# Patient Record
Sex: FEMALE | Race: WHITE | Employment: OTHER | URBAN - METROPOLITAN AREA
[De-identification: names, ages, dates, MRNs, and addresses within clinical notes are randomized per-mention and may not be internally consistent; named-entity substitution may affect disease eponyms.]

---

## 2017-03-03 ENCOUNTER — HOSPITAL ENCOUNTER (OUTPATIENT)
Dept: SURGERY | Age: 62
Discharge: HOME | End: 2017-03-03
Attending: ANESTHESIOLOGY | Admitting: ANESTHESIOLOGY

## 2017-03-03 VITALS
BODY MASS INDEX: 18.69 KG/M2 | WEIGHT: 119.1 LBS | TEMPERATURE: 97.1 F | RESPIRATION RATE: 16 BRPM | HEIGHT: 67 IN | OXYGEN SATURATION: 95 % | HEART RATE: 88 BPM | SYSTOLIC BLOOD PRESSURE: 154 MMHG | DIASTOLIC BLOOD PRESSURE: 82 MMHG

## 2017-03-03 LAB
ANION GAP SERPL CALCULATED.3IONS-SCNC: 7 MMOL/L (ref 3–16)
BUN BLDV-MCNC: 19 MG/DL (ref 7–20)
CALCIUM SERPL-MCNC: 8.9 MG/DL (ref 8.3–10.6)
CHLORIDE BLD-SCNC: 98 MMOL/L (ref 99–110)
CO2: 32 MMOL/L (ref 21–32)
CREAT SERPL-MCNC: 0.9 MG/DL (ref 0.6–1.2)
GFR AFRICAN AMERICAN: >60
GFR NON-AFRICAN AMERICAN: >60
GLUCOSE BLD-MCNC: 167 MG/DL (ref 70–99)
GLUCOSE BLD-MCNC: 185 MG/DL (ref 70–99)
GLUCOSE BLD-MCNC: 213 MG/DL (ref 70–99)
HCT VFR BLD CALC: 34.3 % (ref 36–48)
HEMOGLOBIN: 10.9 G/DL (ref 12–16)
MCH RBC QN AUTO: 27.9 PG (ref 26–34)
MCHC RBC AUTO-ENTMCNC: 31.6 G/DL (ref 31–36)
MCV RBC AUTO: 88.4 FL (ref 80–100)
PDW BLD-RTO: 16.2 % (ref 12.4–15.4)
PERFORMED ON: ABNORMAL
PERFORMED ON: ABNORMAL
PLATELET # BLD: 325 K/UL (ref 135–450)
PMV BLD AUTO: 7.4 FL (ref 5–10.5)
POTASSIUM SERPL-SCNC: 4.3 MMOL/L (ref 3.5–5.1)
RBC # BLD: 3.89 M/UL (ref 4–5.2)
SODIUM BLD-SCNC: 137 MMOL/L (ref 136–145)
WBC # BLD: 9 K/UL (ref 4–11)

## 2017-03-03 RX ORDER — SODIUM CHLORIDE 9 MG/ML
INJECTION, SOLUTION INTRAVENOUS CONTINUOUS
Status: DISCONTINUED | OUTPATIENT
Start: 2017-03-03 | End: 2017-03-04 | Stop reason: HOSPADM

## 2017-03-03 RX ORDER — CIPROFLOXACIN 2 MG/ML
400 INJECTION, SOLUTION INTRAVENOUS ONCE
Status: COMPLETED | OUTPATIENT
Start: 2017-03-03 | End: 2017-03-03

## 2017-03-03 RX ORDER — CLINDAMYCIN PHOSPHATE 900 MG/50ML
900 INJECTION INTRAVENOUS
Status: DISCONTINUED | OUTPATIENT
Start: 2017-03-03 | End: 2017-03-03 | Stop reason: CLARIF

## 2017-03-03 RX ORDER — LIDOCAINE HYDROCHLORIDE 10 MG/ML
0.5 INJECTION, SOLUTION EPIDURAL; INFILTRATION; INTRACAUDAL; PERINEURAL ONCE
Status: DISCONTINUED | OUTPATIENT
Start: 2017-03-03 | End: 2017-03-04 | Stop reason: HOSPADM

## 2017-03-03 RX ADMIN — SODIUM CHLORIDE: 9 INJECTION, SOLUTION INTRAVENOUS at 12:57

## 2017-03-03 RX ADMIN — CIPROFLOXACIN 400 MG: 2 INJECTION, SOLUTION INTRAVENOUS at 13:26

## 2018-02-28 ENCOUNTER — TELEPHONE (OUTPATIENT)
Dept: ENDOCRINOLOGY | Age: 63
End: 2018-02-28

## 2018-02-28 DIAGNOSIS — E10.649 TYPE 1 DIABETES MELLITUS WITH HYPOGLYCEMIA AND WITHOUT COMA (HCC): Primary | ICD-10-CM

## 2018-02-28 DIAGNOSIS — E55.9 VITAMIN D DEFICIENCY: ICD-10-CM

## 2018-03-01 NOTE — TELEPHONE ENCOUNTER
I Mailed her lab order to her home address. I call pt , unable to reached, left voice message to call back.

## 2018-10-29 ENCOUNTER — OFFICE VISIT (OUTPATIENT)
Dept: ENDOCRINOLOGY | Age: 63
End: 2018-10-29
Payer: MEDICARE

## 2018-10-29 ENCOUNTER — TELEPHONE (OUTPATIENT)
Dept: ENDOCRINOLOGY | Age: 63
End: 2018-10-29

## 2018-10-29 VITALS
HEIGHT: 67 IN | BODY MASS INDEX: 18.68 KG/M2 | DIASTOLIC BLOOD PRESSURE: 82 MMHG | WEIGHT: 119 LBS | SYSTOLIC BLOOD PRESSURE: 138 MMHG | OXYGEN SATURATION: 95 % | HEART RATE: 95 BPM

## 2018-10-29 DIAGNOSIS — E78.2 MIXED HYPERLIPIDEMIA: ICD-10-CM

## 2018-10-29 DIAGNOSIS — I10 ESSENTIAL HYPERTENSION: ICD-10-CM

## 2018-10-29 DIAGNOSIS — E04.1 THYROID NODULE: ICD-10-CM

## 2018-10-29 DIAGNOSIS — D35.02 ADENOMA OF LEFT ADRENAL GLAND: ICD-10-CM

## 2018-10-29 PROCEDURE — 3017F COLORECTAL CA SCREEN DOC REV: CPT | Performed by: INTERNAL MEDICINE

## 2018-10-29 PROCEDURE — G8484 FLU IMMUNIZE NO ADMIN: HCPCS | Performed by: INTERNAL MEDICINE

## 2018-10-29 PROCEDURE — 2022F DILAT RTA XM EVC RTNOPTHY: CPT | Performed by: INTERNAL MEDICINE

## 2018-10-29 PROCEDURE — G8420 CALC BMI NORM PARAMETERS: HCPCS | Performed by: INTERNAL MEDICINE

## 2018-10-29 PROCEDURE — 3046F HEMOGLOBIN A1C LEVEL >9.0%: CPT | Performed by: INTERNAL MEDICINE

## 2018-10-29 PROCEDURE — G8427 DOCREV CUR MEDS BY ELIG CLIN: HCPCS | Performed by: INTERNAL MEDICINE

## 2018-10-29 PROCEDURE — 1036F TOBACCO NON-USER: CPT | Performed by: INTERNAL MEDICINE

## 2018-10-29 PROCEDURE — 99215 OFFICE O/P EST HI 40 MIN: CPT | Performed by: INTERNAL MEDICINE

## 2018-10-29 RX ORDER — LEVOTHYROXINE SODIUM 0.05 MG/1
50 TABLET ORAL DAILY
Qty: 30 TABLET | Refills: 3 | Status: SHIPPED | OUTPATIENT
Start: 2018-10-29 | End: 2019-02-17 | Stop reason: SDUPTHER

## 2018-10-29 RX ORDER — CIPROFLOXACIN 250 MG/1
250 TABLET, FILM COATED ORAL 2 TIMES DAILY
COMMUNITY
End: 2019-07-01

## 2018-10-29 NOTE — PROGRESS NOTES
Agus Zambrano is a 58 y.o. female is a known case of type 1 DM (s/p total pancreatectomy 8/07) with chronic abdominal pain, for which she underwent lysis adhesion in Dec 2008 with significant improvement   She started having episodes of pancreatitis and was diagnosed with pseudocyst in pancreas in the early 1980's she had partial whipple in 1989 but she continued to have issues with pancreas and eventually had total pancreactomy     She has hyperlipidemia and is on meds now   Also for panc enzyme deficiency she has been taking creon   She has hypothyroidism and incidental adrenal adenoma which has stayed stable but in the past she was unable to get hormonal finn due to repetitive use of steroid for pain   She was noted to have blocking kidney stone and her urologist tried to stent but couldn't so they ended up having Laser On 12/2015>> --Cystoscopy,Ureteroscopy on the left,Laser Lithotripsy,Stone manipulation with basket retrieval of stone,Removal of left nephroureteral catheter. ---she has been getting steroid shots in hip and knee last one was in April 2016     INTERIM:    Diabetes   She presents for her follow-up diabetic visit. She has type 1 diabetes mellitus. MedicAlert identification noted. The initial diagnosis of diabetes was made 10 years ago. Her disease course has been improving. Hypoglycemia symptoms include dizziness and headaches. Associated symptoms include polyuria and weight loss. There are no hypoglycemic complications. Symptoms are stable. Diabetic complications include peripheral neuropathy. Risk factors for coronary artery disease include diabetes mellitus, hypertension, dyslipidemia and post-menopausal. Current diabetic treatment includes insulin pump. She is compliant with treatment most of the time. Her weight is stable. She is following a generally healthy diet. Meal planning includes avoidance of concentrated sweets. She has not had a previous visit with a dietitian.  She rarely participates

## 2018-10-30 ENCOUNTER — TELEPHONE (OUTPATIENT)
Dept: ENDOCRINOLOGY | Age: 63
End: 2018-10-30

## 2018-12-12 NOTE — TELEPHONE ENCOUNTER
Pt diabetes management needs addendum made to your note, it needs to state the pt tests 9 times per day for medicare.  Note will need faxed to [phone]

## 2018-12-27 RX ORDER — IBUPROFEN 600 MG/1
1 TABLET ORAL SEE ADMIN INSTRUCTIONS
Qty: 1 KIT | Refills: 3 | Status: SHIPPED | OUTPATIENT
Start: 2018-12-27

## 2019-02-04 ENCOUNTER — TELEPHONE (OUTPATIENT)
Dept: ENDOCRINOLOGY | Age: 64
End: 2019-02-04

## 2019-02-18 RX ORDER — LEVOTHYROXINE SODIUM 0.05 MG/1
TABLET ORAL
Qty: 30 TABLET | Refills: 3 | Status: SHIPPED | OUTPATIENT
Start: 2019-02-18 | End: 2019-06-13 | Stop reason: SDUPTHER

## 2019-03-01 LAB — DIABETIC RETINOPATHY: NEGATIVE

## 2019-03-04 ENCOUNTER — TELEPHONE (OUTPATIENT)
Dept: ENDOCRINOLOGY | Age: 64
End: 2019-03-04

## 2019-03-29 ENCOUNTER — HOSPITAL ENCOUNTER (OUTPATIENT)
Age: 64
Discharge: HOME | End: 2019-03-29
Payer: MEDICARE

## 2019-03-29 DIAGNOSIS — D35.02 ADENOMA OF LEFT ADRENAL GLAND: ICD-10-CM

## 2019-03-29 DIAGNOSIS — E04.1 THYROID NODULE: ICD-10-CM

## 2019-03-29 LAB
A/G RATIO: 1.1 (ref 1.1–2.2)
ALBUMIN SERPL-MCNC: 3.6 G/DL (ref 3.4–5)
ALP BLD-CCNC: 76 U/L (ref 40–129)
ALT SERPL-CCNC: 9 U/L (ref 10–40)
ANION GAP SERPL CALCULATED.3IONS-SCNC: 13 MMOL/L (ref 3–16)
AST SERPL-CCNC: 17 U/L (ref 15–37)
BILIRUB SERPL-MCNC: <0.2 MG/DL (ref 0–1)
BUN BLDV-MCNC: 23 MG/DL (ref 7–20)
CALCIUM SERPL-MCNC: 8.9 MG/DL (ref 8.3–10.6)
CHLORIDE BLD-SCNC: 104 MMOL/L (ref 99–110)
CHOLESTEROL, TOTAL: 307 MG/DL (ref 0–199)
CO2: 23 MMOL/L (ref 21–32)
CREAT SERPL-MCNC: 1 MG/DL (ref 0.6–1.2)
CREATININE URINE: 52.1 MG/DL (ref 28–259)
GFR AFRICAN AMERICAN: >60
GFR NON-AFRICAN AMERICAN: 56
GLOBULIN: 3.2 G/DL
GLUCOSE BLD-MCNC: 115 MG/DL (ref 70–99)
HDLC SERPL-MCNC: 101 MG/DL (ref 40–60)
LDL CHOLESTEROL CALCULATED: 187 MG/DL
MICROALBUMIN UR-MCNC: 1.7 MG/DL
MICROALBUMIN/CREAT UR-RTO: 32.6 MG/G (ref 0–30)
POTASSIUM SERPL-SCNC: 3.6 MMOL/L (ref 3.5–5.1)
SODIUM BLD-SCNC: 140 MMOL/L (ref 136–145)
TOTAL PROTEIN: 6.8 G/DL (ref 6.4–8.2)
TRIGL SERPL-MCNC: 95 MG/DL (ref 0–150)
TSH SERPL DL<=0.05 MIU/L-ACNC: 4.14 UIU/ML (ref 0.27–4.2)
VITAMIN B-12: 964 PG/ML (ref 211–911)
VLDLC SERPL CALC-MCNC: 19 MG/DL

## 2019-03-29 PROCEDURE — 83036 HEMOGLOBIN GLYCOSYLATED A1C: CPT

## 2019-03-29 PROCEDURE — 82088 ASSAY OF ALDOSTERONE: CPT

## 2019-03-29 PROCEDURE — 80061 LIPID PANEL: CPT

## 2019-03-29 PROCEDURE — 84244 ASSAY OF RENIN: CPT

## 2019-03-29 PROCEDURE — 82043 UR ALBUMIN QUANTITATIVE: CPT

## 2019-03-29 PROCEDURE — 82607 VITAMIN B-12: CPT

## 2019-03-29 PROCEDURE — 80053 COMPREHEN METABOLIC PANEL: CPT

## 2019-03-29 PROCEDURE — 36415 COLL VENOUS BLD VENIPUNCTURE: CPT

## 2019-03-29 PROCEDURE — 82570 ASSAY OF URINE CREATININE: CPT

## 2019-03-29 PROCEDURE — 84681 ASSAY OF C-PEPTIDE: CPT

## 2019-03-29 PROCEDURE — 84443 ASSAY THYROID STIM HORMONE: CPT

## 2019-03-30 LAB
ESTIMATED AVERAGE GLUCOSE: 174.3 MG/DL
HBA1C MFR BLD: 7.7 %

## 2019-04-01 ENCOUNTER — OFFICE VISIT (OUTPATIENT)
Dept: ENDOCRINOLOGY | Age: 64
End: 2019-04-01
Payer: MEDICARE

## 2019-04-01 VITALS
WEIGHT: 122.2 LBS | SYSTOLIC BLOOD PRESSURE: 138 MMHG | HEIGHT: 67 IN | DIASTOLIC BLOOD PRESSURE: 84 MMHG | BODY MASS INDEX: 19.18 KG/M2 | OXYGEN SATURATION: 96 % | HEART RATE: 89 BPM

## 2019-04-01 DIAGNOSIS — I10 ESSENTIAL HYPERTENSION: ICD-10-CM

## 2019-04-01 DIAGNOSIS — E78.2 MIXED HYPERLIPIDEMIA: ICD-10-CM

## 2019-04-01 DIAGNOSIS — E06.3 HYPOTHYROIDISM DUE TO HASHIMOTO'S THYROIDITIS: ICD-10-CM

## 2019-04-01 DIAGNOSIS — E03.8 HYPOTHYROIDISM DUE TO HASHIMOTO'S THYROIDITIS: ICD-10-CM

## 2019-04-01 DIAGNOSIS — K86.1 CHRONIC BILIARY PANCREATITIS (HCC): ICD-10-CM

## 2019-04-01 LAB — ALDOSTERONE: 7.1 NG/DL

## 2019-04-01 PROCEDURE — 3017F COLORECTAL CA SCREEN DOC REV: CPT | Performed by: INTERNAL MEDICINE

## 2019-04-01 PROCEDURE — G8427 DOCREV CUR MEDS BY ELIG CLIN: HCPCS | Performed by: INTERNAL MEDICINE

## 2019-04-01 PROCEDURE — 3045F PR MOST RECENT HEMOGLOBIN A1C LEVEL 7.0-9.0%: CPT | Performed by: INTERNAL MEDICINE

## 2019-04-01 PROCEDURE — 1036F TOBACCO NON-USER: CPT | Performed by: INTERNAL MEDICINE

## 2019-04-01 PROCEDURE — G8400 PT W/DXA NO RESULTS DOC: HCPCS | Performed by: INTERNAL MEDICINE

## 2019-04-01 PROCEDURE — 1090F PRES/ABSN URINE INCON ASSESS: CPT | Performed by: INTERNAL MEDICINE

## 2019-04-01 PROCEDURE — 1123F ACP DISCUSS/DSCN MKR DOCD: CPT | Performed by: INTERNAL MEDICINE

## 2019-04-01 PROCEDURE — 4040F PNEUMOC VAC/ADMIN/RCVD: CPT | Performed by: INTERNAL MEDICINE

## 2019-04-01 PROCEDURE — 2022F DILAT RTA XM EVC RTNOPTHY: CPT | Performed by: INTERNAL MEDICINE

## 2019-04-01 PROCEDURE — G8420 CALC BMI NORM PARAMETERS: HCPCS | Performed by: INTERNAL MEDICINE

## 2019-04-01 PROCEDURE — 99215 OFFICE O/P EST HI 40 MIN: CPT | Performed by: INTERNAL MEDICINE

## 2019-04-01 NOTE — PROGRESS NOTES
Dejah Marie is a 71 y.o. female seen for type 1 DM (s/p total pancreatectomy 8/07) with chronic abdominal pain, for which she underwent lysis adhesion in Dec 2008 with significant improvement   She started having episodes of pancreatitis and was diagnosed with pseudocyst in pancreas in the early 1980's she had partial whipple in 1989 but she continued to have issues with pancreas and eventually had total pancreactomy     She has hyperlipidemia and is on meds now   Also for panc enzyme deficiency she has been taking creon   She has hypothyroidism and incidental adrenal adenoma which has stayed stable but in the past she was unable to get hormonal finn due to repetitive use of steroid for pain   She was noted to have blocking kidney stone and her urologist tried to stent but couldn't so they ended up having Laser On 12/2015>> --Cystoscopy,Ureteroscopy on the left,Laser Lithotripsy,Stone manipulation with basket retrieval of stone,Removal of left nephroureteral catheter. ---she got steroid shots in hip and knee last one was in April 2016     INTERIM:    Diabetes   She presents for her follow-up diabetic visit. She has type 1 diabetes mellitus. MedicAlert identification noted. The initial diagnosis of diabetes was made 10 years ago. Her disease course has been improving. Hypoglycemia symptoms include dizziness and headaches. Associated symptoms include polyuria and weight loss. There are no hypoglycemic complications. Symptoms are stable. Diabetic complications include peripheral neuropathy. Risk factors for coronary artery disease include diabetes mellitus, hypertension, dyslipidemia and post-menopausal. Current diabetic treatment includes insulin pump. She is compliant with treatment most of the time. Her weight is stable. She is following a generally healthy diet. Meal planning includes avoidance of concentrated sweets. She has not had a previous visit with a dietitian. She rarely participates in exercise.  Her home recent weight loss, no changes in appetite  Eyes: no eye pain, no change in vision, no eye redness, no eye irritation, no double vision  Ears, nose, throat: no nasal congestion, no sore throat, no earache, no decrease in hearing, no hoarseness, no dry mouth, no sinus problems, no difficulty swallowing, no neck lumps, no dental problems, no mouth sores, no ringing in ears  Pulmonary: no shortness of breath, no wheezing, no dyspnea on exertion, no cough  Cardiovascular: no chest pain, no lower extremity edema, no orthopnea, no intermittent leg claudication, no palpitations  Gastrointestinal: no abdominal pain, no nausea, no vomiting, no diarrhea, no constipation, no dysphagia, no heartburn, no bloating  Genitourinary: no dysuria, no urinary incontinence, no urinary hesitancy, no urinary frequency, no feelings of urinary urgency, no nocturia  Musculoskeletal: no joint swelling, no joint stiffness, no joint pain, no muscle cramps, no muscle pain, no bone pain, no fractures  Integument/Breast: hair loss, but no skin rashes, no skin lesions, no itching, no dry skin, no breast pain, no breast mass, no skin hives, no skin discoloration, no nipple discharge  Neurological: no numbness, no tingling, no weakness, no confusion, no headaches, no dizziness, no fainting, no tremors, no decrease in memory, no balance problems  Psychiatric: no anxiety, no depression, no insomnia, no change in personality, no emotional problems, no stress  Hematologic/Lymphatic: no tendency for easy bleeding, no swollen lymph nodes, no tendency for easy bruising  Immunology: no seasonal allergies, no frequent infections, no frequent illnesses  Endocrine: no temperature intolerance no hirsutism, no hot flashes    OBJECTIVE:  /84   Pulse 89   Ht 5' 7\" (1.702 m)   Wt 122 lb 3.2 oz (55.4 kg)   SpO2 96%   BMI 19.14 kg/m²    Wt Readings from Last 3 Encounters:   04/01/19 122 lb 3.2 oz (55.4 kg)   10/29/18 119 lb (54 kg)   03/03/17 119 lb 1.6 oz (54 kg)       Constitutional: no acute distress, well appearing and well nourished  Psychiatric: oriented to person, place and time, judgement and insight and normal, recent and remote memory and intact and mood and affect are normal  Skin: skin and subcutaneous tissue is normal without mass, normal turgor  Head and Face: examination of head and face revealed no abnormalities  Eyes: no lid or conjunctival swelling, erythema or discharge, pupils are normal, equal, round, reactive to light  Ears/Nose: external inspection of ears and nose revealed no abnormalities, hearing is grossly normal  Oropharynx/Mouth/Face: lips, tongue and gums are normal with no lesions, the voice quality was normal  Neck: neck is supple and symmetric, with midline trachea and no masses, thyroid is normal  Pulmonary: no increased work of breathing or signs of respiratory distress, lungs are clear to auscultation  Cardiovascular: normal heart rate and rhythm, normal S1 and S2, no murmurs and pedal pulses and 2+ bilaterally, No edema  Abdomen: abdomen is soft, non-tender with no masses  Musculoskeletal: normal gait and station and exam of the digits and nails are normal  Neurological: normal coordination and normal general cortical function    Visual inspection:  Deformity/amputation: absent  Skin lesions/pre-ulcerative calluses: absent  Edema: right- negative, left- negative    Sensory exam:  Monofilament sensation: normal  (minimum of 5 random plantar locations tested, avoiding callused areas - > 1 area with absence of sensation is + for neuropathy)    Plus at least one of the following:  Pulses: normal,   Pinprick: Intact  Proprioception: Intact  Vibration (128 Hz): Impaired    Lab Results   Component Value Date    LABA1C 7.7 03/29/2019 2018 August  CT ABDOMEN AND CT PELVIS WITHOUT CONTRAST8/17/2018  [facility]  Result Impression   IMPRESSION:   1. No findings for abscess in the abdomen or pelvis by this noncontrast CT.  No free fluid, mesenteric inflammation, or free air. 2. 1 cm area of skin thickening and subcutaneous stranding in the lateral right abdomen at the level of the umbilicus at site of previous a small subcutaneous radiopaque 8 mm linear instrument extending from the skin surface that has now been moved into the left abdomen (images 39-41 series 3). This is probably a medication device by subcutaneous route; correlation with physical examination. 3. Extensive stool burden distending the right colon and transverse colon, with moderate amount of stool in distal colon. Sigmoid colon is tortuous abdominal gas-filled. No bowel inflammation. 4. 2 mm nonobstructing left upper pole renal stone. No obstructive or inflammatory changes of kidneys, with incidental small left extrarenal pelvis. 5. Cholecystectomy, pancreatectomy, splenectomy, and hysterectomy. 6. Benign 1.5 x 1.2 cm left adrenal gland nodule stable from distant CT 6/14/2008, statistically likely an adenoma. 7. Severe degenerative disc disease L5-S1 with posterior decompression laminectomy changes. Also a battery pack in the posterolateral left flank with leads entering the spinal canal at the T12-L1 level and extending superiorly into the thoracic spine are not excluded from view. Workstation [employer]         ASSESSMENT/PLAN:    Secondary Diabetes (s/p pancreatectomy and Islet Auto Transplant ) on CII --- 9.5>>7.7 now She has labile control   I reviewed the pump downloads as well as her DEXCOM data reviewed with her   It appears that she is not taking meal boluses appropriately and she has been reducing the meal boluses as opposed to taking what the pump recommends that leads to postmeal hyperglycemia .   Her basal rate seems to be adequate she needs to work on meal boluses to be taken 10 minutes before eating if all was discussed with her in detail   I have also adjusted her carb to insulin ratio   She does have delayed gastric emptying and has been using dual wave boluses    She is on Carb to insulin ratio to 25 :1 ---sensitivity 75   She will send her data to me in 2 wks   Pt checks her fingerstick blood glucose 9-10 times daily as she gets frequent hypoglycemia and labile fingerstick blood glucose   Hypoglycemia protocol reviewed in detail with patient Patient was advised to carry glucose tablets and also have glucagon emergency kit. Provided with RX for glucagon. - her urine microalb/creat ratio elevated she is on  lisinopril for high BP she was advised to take it regularly will recheck     ___gastroparesis she is following with GI     --Feet Exam Pt was able to appreciate monofilament bilaterally in extremities but vibration sensation was reduced timothy in the left foot oct 2018     ---thyriod nodule left lobe 1.1 cm repeat thyroid uls in June 2016 is stable    I gave her orders to get a repeat ultrasound done at [facility]       -- Dyslipidemia with a strong family history, Lp(a) was normal and her LDL was not at her target. patient has not taken her statins for some time I did send prescription for Crestor 10 milligrams daily   Previously her LDL was less than 100 most recent LDL in March 2019 was 187   In the past she had allergies to various statins     -- BP is at her target, advised to stay on lsinopril .     --- Osteoporosis being followed up by her PMD.in the past she stopped Fosamax because of Se and is not interetsed in resuming meds     --- Fibromyalgia on lyrica or cymbalta ;pt was told that there is lot of drug intercations with neurontin ,alprazolam and dialaudid so she will discuss with psych     --- Depression pt is going to therapist currently not on meds     --- Subclinical Hypothyroidism Thyroid Abs negative elevated TSH 7>> 5 >>4.43 >>6.0>>2.3 >>1.09  On 50 mcg of Lt4 will stay on current for now   Thyroid uls not done by patient again encouraged to get it done     ---Incidental adrenal nodule noted on her ct scan of 2007 but it was commented to be stable in 2014 as well as in August 2018  Stable as per recent ct scan 8/2018    patient's aldosterone was normal at 7 March 2019    her last steroid shot was in July/August 2018 she was with for a few months to do the 1 mg DEXA impression test for me   I Had Ordered hormonal workup in the past as well she has not done so far with her taking steroids shots for back   She was advised to do 24 hr urine cortisol in the past but again numerous steroid shots for pain issues have hindered the testing       --- s/p pancreatectomy she is on creon     -- Vitamin D Def level was 28 >.25   She was advised to take 4000 Iu daily            Reviewed and/or ordered clinical lab results yes   Reviewed and/or ordered radiology tests Yes  Reviewed and/or ordered other diagnostic tests yes   Made a decision to obtain old records yes   Reviewed and summarized old records yes     Farnco German was counseled regarding symptoms of current diagnosis, course and complications of disease if inadequately treated, side effects of medications, diagnosis, treatment options, and prognosis, risks, benefits, complications, and alternatives of treatment, labs, imaging and other studies and treatment targets and goals. She understands instructions and counseling    Total time spent counseling 50 min  , more than 50 % of this was spent on face to face counseling    These diagnosis were discussed and reviewed with the patient including the advantages of drug therapy. She was counseled at this visit on the following: diabetes complication prevention and foot care. Return in about 3 months (around 7/1/2019). Please note that some or all of this report was generated using voice recognition software. Please notify me in case of any questions about the content of this document, as some errors in transcription may have occurred .

## 2019-04-02 LAB
C-PEPTIDE: <0.1 NG/ML (ref 1.1–4.4)
RENIN ACTIVITY: 1.1 NG/ML/HR

## 2019-04-02 RX ORDER — ROSUVASTATIN CALCIUM 10 MG/1
10 TABLET, COATED ORAL DAILY
Qty: 30 TABLET | Refills: 5 | Status: SHIPPED | OUTPATIENT
Start: 2019-04-02 | End: 2019-10-03 | Stop reason: SDUPTHER

## 2019-05-23 ENCOUNTER — TELEPHONE (OUTPATIENT)
Dept: ENDOCRINOLOGY | Age: 64
End: 2019-05-23

## 2019-05-23 NOTE — TELEPHONE ENCOUNTER
Fax from [employer]. Records indicate pt is receiving IBU and Rosuvastatin calcium.  Clinical evidence has shown that there is drug disease state interaction between anti inflammatory drugs and cardiovascular disease  Fax came w/form to be filled out

## 2019-05-28 NOTE — TELEPHONE ENCOUNTER
Fax from [facility] is not on list of covered formulary drugs and patient has been provided a temporary supply

## 2019-06-13 RX ORDER — LEVOTHYROXINE SODIUM 0.05 MG/1
TABLET ORAL
Qty: 30 TABLET | Refills: 3 | Status: SHIPPED | OUTPATIENT
Start: 2019-06-13 | End: 2019-07-23 | Stop reason: SDUPTHER

## 2019-07-01 ENCOUNTER — OFFICE VISIT (OUTPATIENT)
Dept: ENDOCRINOLOGY | Age: 64
End: 2019-07-01
Payer: MEDICARE

## 2019-07-01 VITALS
HEART RATE: 104 BPM | OXYGEN SATURATION: 95 % | DIASTOLIC BLOOD PRESSURE: 80 MMHG | BODY MASS INDEX: 18.93 KG/M2 | WEIGHT: 120.6 LBS | SYSTOLIC BLOOD PRESSURE: 126 MMHG | HEIGHT: 67 IN

## 2019-07-01 DIAGNOSIS — E03.8 HYPOTHYROIDISM DUE TO HASHIMOTO'S THYROIDITIS: ICD-10-CM

## 2019-07-01 DIAGNOSIS — E78.2 MIXED HYPERLIPIDEMIA: ICD-10-CM

## 2019-07-01 DIAGNOSIS — I10 ESSENTIAL HYPERTENSION: ICD-10-CM

## 2019-07-01 DIAGNOSIS — E55.9 VITAMIN D DEFICIENCY: ICD-10-CM

## 2019-07-01 DIAGNOSIS — E06.3 HYPOTHYROIDISM DUE TO HASHIMOTO'S THYROIDITIS: ICD-10-CM

## 2019-07-01 LAB — HBA1C MFR BLD: 7 %

## 2019-07-01 PROCEDURE — 1123F ACP DISCUSS/DSCN MKR DOCD: CPT | Performed by: INTERNAL MEDICINE

## 2019-07-01 PROCEDURE — G8400 PT W/DXA NO RESULTS DOC: HCPCS | Performed by: INTERNAL MEDICINE

## 2019-07-01 PROCEDURE — 4040F PNEUMOC VAC/ADMIN/RCVD: CPT | Performed by: INTERNAL MEDICINE

## 2019-07-01 PROCEDURE — G8427 DOCREV CUR MEDS BY ELIG CLIN: HCPCS | Performed by: INTERNAL MEDICINE

## 2019-07-01 PROCEDURE — 3017F COLORECTAL CA SCREEN DOC REV: CPT | Performed by: INTERNAL MEDICINE

## 2019-07-01 PROCEDURE — 2022F DILAT RTA XM EVC RTNOPTHY: CPT | Performed by: INTERNAL MEDICINE

## 2019-07-01 PROCEDURE — 99215 OFFICE O/P EST HI 40 MIN: CPT | Performed by: INTERNAL MEDICINE

## 2019-07-01 PROCEDURE — 1090F PRES/ABSN URINE INCON ASSESS: CPT | Performed by: INTERNAL MEDICINE

## 2019-07-01 PROCEDURE — 83036 HEMOGLOBIN GLYCOSYLATED A1C: CPT | Performed by: INTERNAL MEDICINE

## 2019-07-01 PROCEDURE — 1036F TOBACCO NON-USER: CPT | Performed by: INTERNAL MEDICINE

## 2019-07-01 PROCEDURE — 3045F PR MOST RECENT HEMOGLOBIN A1C LEVEL 7.0-9.0%: CPT | Performed by: INTERNAL MEDICINE

## 2019-07-01 PROCEDURE — G8420 CALC BMI NORM PARAMETERS: HCPCS | Performed by: INTERNAL MEDICINE

## 2019-07-01 RX ORDER — AMITRIPTYLINE HYDROCHLORIDE 50 MG/1
TABLET, FILM COATED ORAL
Refills: 1 | COMMUNITY
Start: 2019-06-11

## 2019-07-01 RX ORDER — TETRACYCLINE HYDROCHLORIDE 250 MG/1
CAPSULE ORAL
Refills: 0 | COMMUNITY
Start: 2019-06-24

## 2019-07-01 NOTE — PROGRESS NOTES
Not on file     Inability: Not on file    Transportation needs:     Medical: Not on file     Non-medical: Not on file   Tobacco Use    Smoking status: Former Smoker     Last attempt to quit: 1987     Years since quittin.1    Smokeless tobacco: Never Used   Substance and Sexual Activity    Alcohol use: No    Drug use: No    Sexual activity: Not on file   Lifestyle    Physical activity:     Days per week: Not on file     Minutes per session: Not on file    Stress: Not on file   Relationships    Social connections:     Talks on phone: Not on file     Gets together: Not on file     Attends [employer] service: Not on file     Active member of club or organization: Not on file     Attends meetings of clubs or organizations: Not on file     Relationship status: Not on file    Intimate partner violence:     Fear of current or ex partner: Not on file     Emotionally abused: Not on file     Physically abused: Not on file     Forced sexual activity: Not on file   Other Topics Concern    Not on file   Social History Narrative    Not on file     Family History   Problem Relation Age of Onset    Cancer Mother     Heart Disease Mother     Cancer Father     Cancer Sister     Diabetes Sister      Current Outpatient Medications   Medication Sig Dispense Refill    amitriptyline (ELAVIL) 50 MG tablet TAKE 1 TAB ORALLY AT BEDTIME  1    IRON PO Take 2 tablets by mouth daily      levothyroxine (SYNTHROID) 50 MCG tablet TAKE 1 TABLET BY MOUTH EVERY DAY 30 tablet 3    insulin lispro (HUMALOG) 100 UNIT/ML injection vial Take 50 units daily through the pump 2 vial 5    rosuvastatin (CRESTOR) 10 MG tablet Take 1 tablet by mouth daily 30 tablet 5    GLUCAGON EMERGENCY 1 MG injection INJECT 1 MG INTO THE SKIN SEE ADMIN INSTRUCTIONS FOLLOW PACKAGE DIRECTIONS FOR LOW BLOOD SUGAR. 1 kit 3    NEXIUM 40 MG capsule 2 times daily       COMBIVENT  MCG/ACT inhaler 1 puff 4 times daily       nystatin (MYCOSTATIN) Relation Name Status    Mother      Father      Sister  (Not Specified)       Review of Systems  I have reviewed the review of system questionnaire filled by the patient .   Patient was advised to contact PCP for non endocrine signs and symptoms       OBJECTIVE:  /80   Pulse 104   Ht 5' 7\" (1.702 m)   Wt 120 lb 9.6 oz (54.7 kg)   SpO2 95%   BMI 18.89 kg/m²    Wt Readings from Last 3 Encounters:   19 120 lb 9.6 oz (54.7 kg)   19 122 lb 3.2 oz (55.4 kg)   10/29/18 119 lb (54 kg)       Constitutional: no acute distress, well appearing and well nourished  Psychiatric: oriented to person, place and time, judgement and insight and normal, recent and remote memory and intact and mood and affect are normal  Skin: skin and subcutaneous tissue is normal without mass, normal turgor  Head and Face: examination of head and face revealed no abnormalities  Eyes: no lid or conjunctival swelling, erythema or discharge, pupils are normal, equal, round, reactive to light  Ears/Nose: external inspection of ears and nose revealed no abnormalities, hearing is grossly normal  Oropharynx/Mouth/Face: lips, tongue and gums are normal with no lesions, the voice quality was normal  Neck: neck is supple and symmetric, with midline trachea and no masses, thyroid is normal  Pulmonary: no increased work of breathing or signs of respiratory distress, lungs are clear to auscultation  Cardiovascular: normal heart rate and rhythm, normal S1 and S2, no murmurs and pedal pulses and 2+ bilaterally, No edema  Abdomen: abdomen is soft, non-tender with no masses  Musculoskeletal: normal gait and station and exam of the digits and nails are normal  Neurological: normal coordination and normal general cortical function      Lab Results   Component Value Date    LABA1C 7.0 2019    LABA1C 7.7 2019  CT ABDOMEN AND CT PELVIS WITHOUT CONTRAST2018  [facility]  Result Impression document, as some errors in transcription may have occurred .

## 2019-07-18 ENCOUNTER — TELEPHONE (OUTPATIENT)
Dept: ENDOCRINOLOGY | Age: 64
End: 2019-07-18

## 2019-07-18 ENCOUNTER — OFFICE VISIT (OUTPATIENT)
Dept: ENDOCRINOLOGY | Age: 64
End: 2019-07-18
Payer: MEDICARE

## 2019-07-18 PROCEDURE — 97802 MEDICAL NUTRITION INDIV IN: CPT | Performed by: DIETITIAN, REGISTERED

## 2019-07-18 NOTE — PROGRESS NOTES
Medical Nutrition Therapy for Diabetes    Enzo Arnett  July 18, 2019      Patient Care Team:  Mickey Escobar MD as PCP - General    Reason for visit: Type 1 Diabetes, uncontrolled    ASSESSMENT/PLAN:   NUTRITION DIAGNOSIS    #1 Problem: Altered Nutrition-Related Laboratory Values (NC-2.2)  Related to: Endocrine/Diabetes   As Evidenced by: Elevated Plasma glucose and/or HgbA1c levels         #2 Problem: Altered Gastrointestinal Function  Related to Gastroparesis  As evidenced by abnormal gastric emptying study    #3 Problem: Carbohydrate and fiber NI-5.8.4 and NI- 5.8.6                     inconsisitent carbohydrate intake                      Excessive fiber intake    NUTRITION INTERVENTION  Nutrition Prescription: 30-45 grams carbohydrate per meal with protein and non-starch vegetables    Diabetes Education/Counseling included:    Reviewed high fiber and high fat foods   Provided lower fiber alternatives    Discussed glycemic index at patient request    Interventions:  Suggested a limit of 40-45 grams fat per day with canned fruit, well cooked vegetables and   Lower fiber grains) Encouraged patient Limit fiber in foods to 1.5 grams per serving. Recommended patient use Miralax as prescribed. Emailed patient glycemic index reference  Handout:  High fiber foods with serving sizes and fiber grams.        NUTRITION MONITORING AND EVALUATION  30-45 grams carbohydrate per meal  Limit higher fiber foods  Limit high fat meals         Patient Active Problem List   Diagnosis    Abdominal pain    Chronic pancreatitis (HCC)    Osteoarthritis, knee    Right knee pain    Myofascial pain syndrome    Right-sided chest wall pain    DDD (degenerative disc disease), lumbosacral    Greater trochanteric bursitis       Current Outpatient Medications   Medication Sig Dispense Refill    tetracycline (ACHROMYCIN;SUMYCIN) 250 MG capsule TAKE 1 CAP BY MOUTH EVERY 6 HOURS  0    amitriptyline (ELAVIL) 50 MG tablet TAKE 1 Results   Component Value Date    LDLCALC 187 (H) 03/29/2019     Lab Results   Component Value Date    LABVLDL 19 03/29/2019     No results found for: [facility]    Lab Results   Component Value Date    WBC 9.0 03/03/2017    HGB 10.9 (L) 03/03/2017    HCT 34.3 (L) 03/03/2017    MCV 88.4 03/03/2017     03/03/2017       Lab Results   Component Value Date    CREATININE 1.0 03/29/2019    BUN 23 (H) 03/29/2019     03/29/2019    K 3.6 03/29/2019     03/29/2019    CO2 23 03/29/2019       Diabetes Medications: Yes  Patient wears Tandem insulin pump  Knows name and dose of prescribed medications Yes  Knows prescribed schedule for medicationsYes  Recent change in medication type/dosage: Yes  Stores  medications properlyYes  Comments:     Monitoring:   Has BG meter: Yes using Dexcom CGM  Testing frequency:   Recent results:   Patient had several interruptions on CGM sensor today. Thought she was low, but fingerstick indicated not. Encouraged patient to contact Dexcom. Anthropometric Measurements: Wt:   Wt Readings from Last 3 Encounters:   07/01/19 120 lb 9.6 oz (54.7 kg)   04/01/19 122 lb 3.2 oz (55.4 kg)   10/29/18 119 lb (54 kg)      BMI:   BMI Readings from Last 3 Encounters:   07/01/19 18.89 kg/m²   04/01/19 19.14 kg/m²   10/29/18 18.64 kg/m²     Patient's stated goal weight:   7% Weight loss goal weight:     Physical Activity History:   Physical activity:   Frequency of activity:   Duration of activity:   Obstacles to activity:     Food and Nutrition History:   Nutrition Awareness/Previous DSMES: yes  Beverage consumption: water, coffee  Alcohol consumption: no  Frequency of Meals Eaten away from home:monthly  Food Availability Problems: No  Usual Food consumption:   Patient says, \" I'm not a very good eater. \"  2 meals and evening snack  Patient delays eating to the afternoon most days. Barriers:   -none          Follow Up Plan: Will remain available as needed.   Patient has my contact

## 2019-07-22 ENCOUNTER — TELEPHONE (OUTPATIENT)
Dept: ENDOCRINOLOGY | Age: 64
End: 2019-07-22

## 2019-07-22 ENCOUNTER — PATIENT MESSAGE (OUTPATIENT)
Dept: ENDOCRINOLOGY | Age: 64
End: 2019-07-22

## 2019-07-22 DIAGNOSIS — E06.3 HYPOTHYROIDISM DUE TO HASHIMOTO'S THYROIDITIS: ICD-10-CM

## 2019-07-22 DIAGNOSIS — E03.8 HYPOTHYROIDISM DUE TO HASHIMOTO'S THYROIDITIS: ICD-10-CM

## 2019-07-23 RX ORDER — LEVOTHYROXINE SODIUM 0.05 MG/1
TABLET ORAL
Qty: 90 TABLET | Refills: 0 | Status: SHIPPED | OUTPATIENT
Start: 2019-07-23

## 2019-10-03 RX ORDER — ROSUVASTATIN CALCIUM 10 MG/1
TABLET, COATED ORAL
Qty: 30 TABLET | Refills: 3 | Status: SHIPPED | OUTPATIENT
Start: 2019-10-03 | End: 2020-01-28

## 2020-01-28 RX ORDER — ROSUVASTATIN CALCIUM 10 MG/1
TABLET, COATED ORAL
Qty: 30 TABLET | Refills: 3 | Status: SHIPPED | OUTPATIENT
Start: 2020-01-28

## 2020-03-09 NOTE — TELEPHONE ENCOUNTER
Advise patient that her insurance is not covering Humalog anymore and they sent us a letter requesting a change to NovoLog if that is okay with her we can make that change

## 2020-03-25 NOTE — TELEPHONE ENCOUNTER
Voicemail from [address] [facility] [phone] stating the pt is out of her Humalog / Insulin Lispro and will need a refill     LOV   7-1-19  FOV   none

## 2020-03-27 NOTE — TELEPHONE ENCOUNTER
Medication:   Requested Prescriptions     Pending Prescriptions Disp Refills    insulin lispro (HUMALOG) 100 UNIT/ML injection vial 20 mL 5     Sig: TAKE 50 UNITS DAILY THROUGH THE PUMP Dx: e10.65     Signed Prescriptions Disp Refills    insulin lispro (HUMALOG) 100 UNIT/ML injection vial 20 mL 5     Sig: TAKE 50 UNITS DAILY THROUGH THE PUMP Dx: e10.65     Authorizing Provider: Ramiro Ambrosio     Changed pharmacy to [facility], patient can only take Humalog    Last appt: 7/18/2019   Next appt: Visit date not found    Last OARRS: No flowsheet data found.

## 2020-03-30 NOTE — TELEPHONE ENCOUNTER
Fax from [facility]. Need for clarification on Insulin Lispro 100/ml.  Need new rx w/ diagnosis code to bill Medicare Part B

## 2020-10-01 ENCOUNTER — TELEPHONE (OUTPATIENT)
Dept: ENDOCRINOLOGY | Age: 65
End: 2020-10-01

## 2020-10-01 NOTE — TELEPHONE ENCOUNTER
Patient has not been seen by out office since July 2019. Attempted to call patient. Number we have on file does not work.